# Patient Record
Sex: FEMALE | Race: OTHER | Employment: FULL TIME | URBAN - METROPOLITAN AREA
[De-identification: names, ages, dates, MRNs, and addresses within clinical notes are randomized per-mention and may not be internally consistent; named-entity substitution may affect disease eponyms.]

---

## 2021-05-13 ENCOUNTER — OFFICE VISIT (OUTPATIENT)
Dept: FAMILY MEDICINE CLINIC | Age: 36
End: 2021-05-13
Payer: COMMERCIAL

## 2021-05-13 VITALS
SYSTOLIC BLOOD PRESSURE: 123 MMHG | BODY MASS INDEX: 41.35 KG/M2 | HEART RATE: 91 BPM | TEMPERATURE: 97.5 F | WEIGHT: 219 LBS | DIASTOLIC BLOOD PRESSURE: 77 MMHG | HEIGHT: 61 IN

## 2021-05-13 DIAGNOSIS — F34.1 DYSTHYMIA: ICD-10-CM

## 2021-05-13 DIAGNOSIS — E28.2 PCOS (POLYCYSTIC OVARIAN SYNDROME): ICD-10-CM

## 2021-05-13 DIAGNOSIS — R10.30 LOWER ABDOMINAL PAIN: ICD-10-CM

## 2021-05-13 DIAGNOSIS — Z76.89 ENCOUNTER TO ESTABLISH CARE WITH NEW DOCTOR: Primary | ICD-10-CM

## 2021-05-13 DIAGNOSIS — J45.20 MILD INTERMITTENT ASTHMA WITHOUT COMPLICATION: ICD-10-CM

## 2021-05-13 DIAGNOSIS — B35.3 TINEA PEDIS OF RIGHT FOOT: ICD-10-CM

## 2021-05-13 DIAGNOSIS — R30.0 DYSURIA: ICD-10-CM

## 2021-05-13 DIAGNOSIS — B37.31 VAGINAL YEAST INFECTION: ICD-10-CM

## 2021-05-13 LAB
BACTERIA URINE, POC: NORMAL
BILIRUBIN URINE: 0 MG/DL
BLOOD, URINE: NEGATIVE
CASTS URINE, POC: NORMAL
CLARITY: CLEAR
COLOR: YELLOW
CRYSTALS URINE, POC: NORMAL
EPI CELLS URINE, POC: NORMAL
GLUCOSE URINE: NEGATIVE
KETONES, URINE: NEGATIVE
LEUKOCYTE EST, POC: NEGATIVE
NITRITE, URINE: NEGATIVE
PH UA: 7 (ref 4.5–8)
PROTEIN UA: NEGATIVE
RBC URINE, POC: NORMAL
SPECIFIC GRAVITY UA: 1.02 (ref 1–1.03)
UROBILINOGEN, URINE: NORMAL
WBC URINE, POC: NORMAL
YEAST URINE, POC: NORMAL

## 2021-05-13 PROCEDURE — 99204 OFFICE O/P NEW MOD 45 MIN: CPT | Performed by: NURSE PRACTITIONER

## 2021-05-13 PROCEDURE — 81000 URINALYSIS NONAUTO W/SCOPE: CPT | Performed by: NURSE PRACTITIONER

## 2021-05-13 RX ORDER — UBIDECARENONE 75 MG
50 CAPSULE ORAL DAILY
COMMUNITY

## 2021-05-13 RX ORDER — FEXOFENADINE HCL 180 MG/1
180 TABLET ORAL DAILY
COMMUNITY

## 2021-05-13 RX ORDER — FLUCONAZOLE 150 MG/1
150 TABLET ORAL ONCE
Qty: 1 TABLET | Refills: 1 | Status: SHIPPED | OUTPATIENT
Start: 2021-05-13 | End: 2021-05-13

## 2021-05-13 RX ORDER — CITALOPRAM 10 MG/1
10 TABLET ORAL DAILY
COMMUNITY
End: 2021-05-13 | Stop reason: SDUPTHER

## 2021-05-13 RX ORDER — MULTIVIT-MIN/IRON/FOLIC ACID/K 18-600-40
1 CAPSULE ORAL DAILY
COMMUNITY

## 2021-05-13 RX ORDER — CITALOPRAM 10 MG/1
10 TABLET ORAL DAILY
Qty: 90 TABLET | Refills: 0 | Status: SHIPPED | OUTPATIENT
Start: 2021-05-13 | End: 2021-08-04

## 2021-05-13 RX ORDER — MONTELUKAST SODIUM 10 MG/1
10 TABLET ORAL EVERY EVENING
COMMUNITY
End: 2021-05-13 | Stop reason: SDUPTHER

## 2021-05-13 RX ORDER — KETOCONAZOLE 20 MG/G
CREAM TOPICAL
Qty: 30 G | Refills: 1 | Status: SHIPPED | OUTPATIENT
Start: 2021-05-13

## 2021-05-13 RX ORDER — MONTELUKAST SODIUM 10 MG/1
10 TABLET ORAL EVERY EVENING
Qty: 90 TABLET | Refills: 0 | Status: SHIPPED | OUTPATIENT
Start: 2021-05-13 | End: 2021-08-16 | Stop reason: SDUPTHER

## 2021-05-13 ASSESSMENT — ENCOUNTER SYMPTOMS
COUGH: 0
DIARRHEA: 0
NAUSEA: 0
SHORTNESS OF BREATH: 0
VOMITING: 0

## 2021-05-13 NOTE — PROGRESS NOTES
daily      vitamin B-12 (CYANOCOBALAMIN) 100 MCG tablet Take 50 mcg by mouth daily      Cholecalciferol (VITAMIN D) 50 MCG (2000 UT) CAPS capsule Take 1 capsule by mouth daily      citalopram (CELEXA) 10 MG tablet Take 1 tablet by mouth daily 90 tablet 0    montelukast (SINGULAIR) 10 MG tablet Take 1 tablet by mouth every evening 90 tablet 0    ketoconazole (NIZORAL) 2 % cream Apply topically daily. 30 g 1     No current facility-administered medications for this visit. Review of Systems   Constitutional: Negative for activity change, appetite change, chills, fatigue and fever. Respiratory: Negative for cough and shortness of breath. Cardiovascular: Negative for chest pain and palpitations. Gastrointestinal: Negative for diarrhea, nausea and vomiting. Skin: Positive for rash. Past medical, surgical, family and social history were reviewed and updated with the patient. Objective:    /77   Pulse 91   Temp 97.5 °F (36.4 °C)   Ht 5' 1.25\" (1.556 m)   Wt 219 lb (99.3 kg)   BMI 41.04 kg/m²   Weight: 219 lb (99.3 kg)     BP Readings from Last 3 Encounters:   05/13/21 123/77     Wt Readings from Last 3 Encounters:   05/13/21 219 lb (99.3 kg)     Physical Exam  Constitutional:       General: She is not in acute distress. Appearance: She is well-developed. HENT:      Head: Normocephalic and atraumatic. Cardiovascular:      Rate and Rhythm: Normal rate and regular rhythm. Heart sounds: Normal heart sounds, S1 normal and S2 normal.   Pulmonary:      Effort: Pulmonary effort is normal. No respiratory distress. Breath sounds: Normal breath sounds. Abdominal:      General: Abdomen is flat. Bowel sounds are normal.      Palpations: Abdomen is soft. Tenderness: There is no abdominal tenderness. There is no right CVA tenderness, left CVA tenderness, guarding or rebound. Musculoskeletal:        Feet:    Skin:     General: Skin is warm and dry.    Neurological:      Mental dose Repeat in 3-5 days if symptoms continue. Dispense: 1 tablet; Refill: 1    8. Tinea pedis of right foot  Initiate ketoconazole. May use gold bond powder during the day to keep feet dry. - ketoconazole (NIZORAL) 2 % cream; Apply topically daily. Dispense: 30 g; Refill: 1     Myrna Olea was counseled regarding symptoms of current diagnosis, course and complications of disease if inadequately treated. Discussed side effects of medications, diagnosis, treatment options, and prognosis along with risks, benefits, complications, and alternatives of treatment including labs, imaging and other studies/treatment targets and goals. She verbalized understanding of instructions and counseling. Return in about 3 months (around 8/13/2021) for annual exam.     Medical decision making of moderate complexity.

## 2021-05-13 NOTE — PATIENT INSTRUCTIONS
as of: July 2, 2020               Content Version: 12.8  © 2412-6964 [employer]. Care instructions adapted under license by [facility] ([facility]). If you have questions about a medical condition or this instruction, always ask your healthcare professional. [address] any warranty or liability for your use of this information.

## 2021-08-04 DIAGNOSIS — F34.1 DYSTHYMIA: ICD-10-CM

## 2021-08-04 RX ORDER — CITALOPRAM 10 MG/1
TABLET ORAL
Qty: 90 TABLET | Refills: 0 | Status: SHIPPED | OUTPATIENT
Start: 2021-08-04

## 2021-08-04 NOTE — TELEPHONE ENCOUNTER
Medication:   Requested Prescriptions     Pending Prescriptions Disp Refills    citalopram (CELEXA) 10 MG tablet [Pharmacy Med Name: CITALOPRAM HBR 10 MG TABLET] 90 tablet 0     Sig: TAKE 1 TABLET BY MOUTH EVERY DAY      Last Filled:      Patient Phone Number: [phone] (home)     Last appt: 5/13/2021   Next appt: 8/19/2021    Last OARRS: No flowsheet data found.   PDMP Monitoring:    Last PDMP Angel Luis Marrero as Reviewed [facility]):  Review User Review Instant Review Result          Preferred Pharmacy:   [facility] [address] [address]  Phone: [phone] Fax: [phone]

## 2021-08-16 DIAGNOSIS — J45.20 MILD INTERMITTENT ASTHMA WITHOUT COMPLICATION: ICD-10-CM

## 2021-08-16 RX ORDER — MONTELUKAST SODIUM 10 MG/1
10 TABLET ORAL EVERY EVENING
Qty: 90 TABLET | Refills: 0 | Status: SHIPPED | OUTPATIENT
Start: 2021-08-16 | End: 2022-01-17

## 2021-08-16 NOTE — TELEPHONE ENCOUNTER
Medication:   Requested Prescriptions     Pending Prescriptions Disp Refills    montelukast (SINGULAIR) 10 MG tablet 90 tablet 0     Sig: Take 1 tablet by mouth every evening        Last Filled: 5/13/2021      Patient Phone Number: [phone] (home)     Last appt: 5/13/2021   Next appt: 8/19/2021    Last OARRS: No flowsheet data found.

## 2022-01-16 DIAGNOSIS — J45.20 MILD INTERMITTENT ASTHMA WITHOUT COMPLICATION: ICD-10-CM

## 2022-01-17 RX ORDER — MONTELUKAST SODIUM 10 MG/1
TABLET ORAL
Qty: 90 TABLET | Refills: 0 | Status: SHIPPED | OUTPATIENT
Start: 2022-01-17

## 2022-01-17 NOTE — TELEPHONE ENCOUNTER
Medication:   Requested Prescriptions     Pending Prescriptions Disp Refills    montelukast (SINGULAIR) 10 MG tablet [Pharmacy Med Name: MONTELUKAST SOD 10 MG TABLET] 90 tablet 0     Sig: TAKE 1 TABLET BY MOUTH EVERY DAY IN THE EVENING      Last Filled:      Patient Phone Number: [phone] (home)     Last appt: 5/13/2021   Next appt: Visit date not found    Last OARRS: No flowsheet data found.   PDMP Monitoring:    Last PDMP Kaye orellana Reviewed [facility]):  Review User Review Instant Review Result          Preferred Pharmacy:       [facility] #[id number] - [place] [address]  Phone: [phone] Fax: [phone]

## 2022-03-20 DIAGNOSIS — J45.20 MILD INTERMITTENT ASTHMA WITHOUT COMPLICATION: ICD-10-CM

## 2022-03-21 RX ORDER — MONTELUKAST SODIUM 10 MG/1
TABLET ORAL
Qty: 30 TABLET | Refills: 2 | OUTPATIENT
Start: 2022-03-21

## 2022-03-21 NOTE — TELEPHONE ENCOUNTER
Medication:   Requested Prescriptions     Pending Prescriptions Disp Refills    montelukast (SINGULAIR) 10 MG tablet [Pharmacy Med Name: MONTELUKAST SOD 10 MG TABLET] 30 tablet 2     Sig: TAKE 1 TABLET BY MOUTH EVERY DAY IN THE EVENING      Last Filled:      Patient Phone Number: [phone] (home)     Last appt: 5/13/2021   Next appt: Visit date not found    Last OARRS: No flowsheet data found.   PDMP Monitoring:    Last PDMP Carlie Asencio as Reviewed [facility]):  Review User Review Instant Review Result          Preferred Pharmacy:       [facility]/pharmacy #[id number] - [address]  Phone: [phone] Fax: [phone]

## 2022-08-16 DIAGNOSIS — B35.3 TINEA PEDIS OF RIGHT FOOT: ICD-10-CM

## 2022-08-18 RX ORDER — KETOCONAZOLE 20 MG/G
CREAM TOPICAL
Qty: 30 G | Refills: 1 | OUTPATIENT
Start: 2022-08-18

## 2022-08-18 NOTE — TELEPHONE ENCOUNTER
Medication:   Requested Prescriptions     Pending Prescriptions Disp Refills    ketoconazole (NIZORAL) 2 % cream [Pharmacy Med Name: KETOCONAZOLE 2% CREAM] 30 g 1     Sig: APPLY TOPICALLY DAILY. Last Filled:      Patient Phone Number: [phone] (home)     Last appt: 5/13/2021   Next appt: Visit date not found    Last OARRS: No flowsheet data found.   PDMP Monitoring:    Last PDMP Meliton orellana Reviewed [facility]):  Review User Review Instant Review Result          Preferred Pharmacy:   [facility] [address] [address]  Phone: [phone] Fax: [phone]